# Patient Record
Sex: FEMALE | URBAN - METROPOLITAN AREA
[De-identification: names, ages, dates, MRNs, and addresses within clinical notes are randomized per-mention and may not be internally consistent; named-entity substitution may affect disease eponyms.]

---

## 2020-11-18 ENCOUNTER — COMMUNICATION - RIVER FALLS (OUTPATIENT)
Dept: FAMILY MEDICINE | Facility: CLINIC | Age: 20
End: 2020-11-18

## 2020-11-18 ENCOUNTER — OFFICE VISIT - RIVER FALLS (OUTPATIENT)
Dept: FAMILY MEDICINE | Facility: CLINIC | Age: 20
End: 2020-11-18

## 2020-11-18 ENCOUNTER — AMBULATORY - RIVER FALLS (OUTPATIENT)
Dept: FAMILY MEDICINE | Facility: CLINIC | Age: 20
End: 2020-11-18

## 2020-11-18 LAB
DEPRECATED S PYO AG THROAT QL EIA: NOT DETECTED
HETEROPHILE - HISTORICAL: NEGATIVE

## 2020-11-23 ENCOUNTER — COMMUNICATION - RIVER FALLS (OUTPATIENT)
Dept: FAMILY MEDICINE | Facility: CLINIC | Age: 20
End: 2020-11-23

## 2022-02-16 NOTE — PROGRESS NOTES
Patient:   CHAITANYA DURAN            MRN: [id number]            FIN: [id number]               Age:   20 years     Sex:  Female     :  2000   Associated Diagnoses:   Pharyngitis   Author:   Micha Medrano MD      Impression and Plan   Diagnosis     Pharyngitis ([id number] J02.9).     Course:  Worsening.    Orders     Orders   Charges (Evaluation and Management):  48098 office outpatient visit 15 minutes (Charge) (Order): Quantity: 1, Pharyngitis.     Orders (Selected)   Outpatient Orders  Ordered (Dispatched)  Heterophile, mono screen* ([facility]): Specimen Type: Serum, Collection Date: 20 15:06:00 CST  POC, GROUP A STREP* ([facility]): Specimen Type: Swab, Collection Date: 20 15:06:00 CST.        Visit Information      Date of Service: 2020 02:27 pm  Performing Location: [facility]  Encounter#: [id number]      Primary Care Provider (PCP):  NONE ,       Referring Provider:  Micha Medrano MD    NPI# [id number]   Visit type:  Video Visit via GetApp.    Participants in room during visit:  _Patient only   Accompanied by:  No one.    Source of history:  Self.    Location of patient:  _home  Location of provider:  _ Clinic  Video Start Time:  _1500  Video End Time:   _1511    Today's visit was conducted via video conference due to the COVID-19 pandemic.  The patient's consent to proceed with a video visit has been obtained and documented.   Referral source:  Self.    History limitation:  None.       Chief Complaint   2020 2:39 PM CST   consent for video visit for COVID sx but had negative test        History of Present Illness   Patient is a 20_ year old F_ who is being evaluated via a billable video visit.             The patient presents with This is video visit conducted due to the coronavirus pandemic.  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  The patient has had 2 COVID-19 tests at [employer] that were both negative.  She reports that her  roommate currently has mononucleosis.  She has not had profound fatigue or cervical lymphadenopathy.  She has not been febrile and has not had a cough.  She has had some chest discomfort and slight shortness of breath with exertion.  The differential includes common cold, mononucleosis and strep pharyngitis.  I recommended she present to the clinic for a rapid strep test and a Monospot.  If they are both negative the leading differential would become common cold.  If her symptoms fail to improve in another 5 days I would recommend follow-up for a second Monospot.  .        Review of Systems   Constitutional:  Negative.    Eye:  Negative.    Ear/Nose/Mouth/Throat:  Negative.    Respiratory:  Negative.    Cardiovascular:  Negative.    Gastrointestinal:  Negative.    Genitourinary:  Negative.    Immunologic:  Negative.    Musculoskeletal:  Negative.    Integumentary:  Negative.    Neurologic:  Negative.    Psychiatric:  Negative.       Health Status   Allergies:    Allergic Reactions (Selected)  No Known Medication Allergies   Medications:  (Selected)   ,    Medications          No Known Home Medications     Problem list:    No problem items selected or recorded.      Histories   Past Medical History:    No active or resolved past medical history items have been selected or recorded.   Family History:    No family history items have been selected or recorded.   Procedure history:    No active procedure history items have been selected or recorded.   Social History:        Electronic Cigarette/Vaping Assessment            Electronic Cigarette Use: Never.      Tobacco Assessment            Never (less than 100 in lifetime)        Physical Examination   General:  Alert and oriented, No acute distress.    Eye:  Pupils are equal, round and reactive to light, Extraocular movements are intact, Normal conjunctiva.    Respiratory:  Respirations are non-labored.    Integumentary:  Warm, Dry, Pink.    Neurologic:  Normal motor  function.    Psychiatric:  Cooperative, Appropriate mood & affect, Normal judgment, Non-suicidal.         Mood and affect: Calm.         Behavior: Relaxed.         Judgment: Able to make sensible decisions, Appropriate in social situations.         Thought process: Appropriate.       Health Maintenance      Recommendations     Pending (in the next year)        OverDue           Influenza Vaccine due  08/31/20  and every 1  year(s)        Due            Alcohol Misuse Screen (Female) due  11/18/20  and every 1  year(s)           Body Mass Index Check (Female) due  11/18/20  and every 1  year(s)           Chlamydia Screen (if sexually active) due  11/18/20  and every 1  year(s)           Depression Screen (Female) due  11/18/20  and every 1  year(s)           Gonorrhea Screen (if sexually active) due  11/18/20  and every 1  year(s)           HIV Screen (if sexually active) (Female) due  11/18/20  and every 1  year(s)           High Blood Pressure Screen (Female) due  11/18/20  and every 1  year(s)           Intimate Partner Violence Screening due  11/18/20  and every 1  year(s)           STD Counseling (if sexually active) (Female) due  11/18/20  and every 1  year(s)           Syphilis Screen (if sexually active) (Female) due  11/18/20  and every 1  year(s)           Tetanus Vaccine due  11/18/20  and every 10  year(s)     Satisfied (in the past 1 year)        Satisfied            Tobacco Use Screen (Female) on  11/18/20.

## 2022-02-16 NOTE — NURSING NOTE
Comprehensive Intake Entered On:  11/18/2020 2:40 PM CST    Performed On:  11/18/2020 2:39 PM CST by Cee Zavala CMA               Summary   Chief Complaint :   consent for video visit for COVID sx but had negative test   Cee Zavala CMA - 11/18/2020 2:39 PM CST   Health Status   Allergies Verified? :   Yes   Medication History Verified? :   Yes   Medical History Verified? :   Yes   Tobacco Use? :   Never smoker   Cee Zavala CMA - 11/18/2020 2:39 PM CST   Consents   Consent for Immunization Exchange :   Consent Granted   Consent for Immunizations to Providers :   Consent Granted   Cee Zavala CMA - 11/18/2020 2:39 PM CST   Meds / Allergies   (As Of: 11/18/2020 2:40:18 PM CST)   Allergies (Active)   No Known Medication Allergies  Estimated Onset Date:   Unspecified ; Created By:   Cee Zavala CMA; Reaction Status:   Active ; Category:   Drug ; Substance:   No Known Medication Allergies ; Type:   Allergy ; Updated By:   Cee Zavala CMA; Reviewed Date:   11/18/2020 2:39 PM CST        Medication List   (As Of: 11/18/2020 2:40:18 PM CST)   No Known Home Medications     Cee Zavala CMA - 11/18/2020 2:39:56 PM           ID Risk Screen   Recent Travel History :   No recent travel   Family Member Travel History :   No recent travel   Other Exposure to Infectious Disease :   Unknown   Cee Zavala CMA - 11/18/2020 2:39 PM CST   Social History   Social History   (As Of: 11/18/2020 2:40:18 PM CST)   Tobacco:        Never (less than 100 in lifetime)   (Last Updated: 11/18/2020 2:40:15 PM CST by Cee Zavala CMA)          Electronic Cigarette/Vaping:        Electronic Cigarette Use: Never.   (Last Updated: 11/18/2020 2:40:15 PM CST by Cee Zavala CMA)

## 2022-02-16 NOTE — LETTER
(Inserted Image. Unable to display)   [address] [place] [address]  November 23, 2020      CHAITANYA DURAN  [address] [place] [id number]        Dear CHAITANYA,     Thank you for selecting [facility] for your healthcare needs. Below you will find the results of the recent tests done at our clinic.      The Strep Culture was NEGATIVE      Result Name Current Result   Culture Strep A  See comment 11/18/2020       Please contact my practice at [phone] if you have any questions or concerns.     Sincerely,        Micha Medrano MD          What do your labs mean?  Below is a glossary of commonly ordered labs:  LDL   Bad Cholesterol   HDL   Good Cholesterol  AST/ALT   Liver Function   Cr/Creatinine   Kidney Function  Microalbumin   Kidney Function  BUN   Kidney Function  PSA   Prostate    TSH   Thyroid Hormone  HgbA1c   Diabetes Test   Hgb (Hemoglobin)   Red Blood Cells